# Patient Record
Sex: MALE | Race: WHITE | NOT HISPANIC OR LATINO | Employment: FULL TIME | URBAN - METROPOLITAN AREA
[De-identification: names, ages, dates, MRNs, and addresses within clinical notes are randomized per-mention and may not be internally consistent; named-entity substitution may affect disease eponyms.]

---

## 2023-02-28 LAB
ALANINE AMINOTRANSFERASE (SGPT) (U/L) IN SER/PLAS: 12 U/L (ref 10–52)
ALBUMIN (G/DL) IN SER/PLAS: 4.8 G/DL (ref 3.4–5)
ALKALINE PHOSPHATASE (U/L) IN SER/PLAS: 52 U/L (ref 33–120)
ANION GAP IN SER/PLAS: 11 MMOL/L (ref 10–20)
ASPARTATE AMINOTRANSFERASE (SGOT) (U/L) IN SER/PLAS: 17 U/L (ref 9–39)
BASOPHILS (10*3/UL) IN BLOOD BY AUTOMATED COUNT: 0.03 X10E9/L (ref 0–0.1)
BASOPHILS/100 LEUKOCYTES IN BLOOD BY AUTOMATED COUNT: 0.7 % (ref 0–2)
BILIRUBIN TOTAL (MG/DL) IN SER/PLAS: 0.8 MG/DL (ref 0–1.2)
C REACTIVE PROTEIN (MG/L) IN SER/PLAS BY HIGH SENSIT: 1.1 MG/L
CALCIUM (MG/DL) IN SER/PLAS: 9.8 MG/DL (ref 8.6–10.6)
CARBON DIOXIDE, TOTAL (MMOL/L) IN SER/PLAS: 29 MMOL/L (ref 21–32)
CHLORIDE (MMOL/L) IN SER/PLAS: 103 MMOL/L (ref 98–107)
CHOLESTEROL (MG/DL) IN SER/PLAS: 150 MG/DL (ref 0–199)
CHOLESTEROL IN HDL (MG/DL) IN SER/PLAS: 49.8 MG/DL
CHOLESTEROL/HDL RATIO: 3
COBALAMIN (VITAMIN B12) (PG/ML) IN SER/PLAS: 507 PG/ML (ref 211–911)
CREATININE (MG/DL) IN SER/PLAS: 0.95 MG/DL (ref 0.5–1.3)
EOSINOPHILS (10*3/UL) IN BLOOD BY AUTOMATED COUNT: 0.19 X10E9/L (ref 0–0.7)
EOSINOPHILS/100 LEUKOCYTES IN BLOOD BY AUTOMATED COUNT: 4.4 % (ref 0–6)
ERYTHROCYTE DISTRIBUTION WIDTH (RATIO) BY AUTOMATED COUNT: 13.1 % (ref 11.5–14.5)
ERYTHROCYTE MEAN CORPUSCULAR HEMOGLOBIN CONCENTRATION (G/DL) BY AUTOMATED: 33.2 G/DL (ref 32–36)
ERYTHROCYTE MEAN CORPUSCULAR VOLUME (FL) BY AUTOMATED COUNT: 87 FL (ref 80–100)
ERYTHROCYTES (10*6/UL) IN BLOOD BY AUTOMATED COUNT: 5.47 X10E12/L (ref 4.5–5.9)
GFR MALE: >90 ML/MIN/1.73M2
GLUCOSE (MG/DL) IN SER/PLAS: 78 MG/DL (ref 74–99)
HEMATOCRIT (%) IN BLOOD BY AUTOMATED COUNT: 47.6 % (ref 41–52)
HEMOGLOBIN (G/DL) IN BLOOD: 15.8 G/DL (ref 13.5–17.5)
IMMATURE GRANULOCYTES/100 LEUKOCYTES IN BLOOD BY AUTOMATED COUNT: 0.2 % (ref 0–0.9)
LDL: 84 MG/DL (ref 0–99)
LEUKOCYTES (10*3/UL) IN BLOOD BY AUTOMATED COUNT: 4.3 X10E9/L (ref 4.4–11.3)
LYMPHOCYTES (10*3/UL) IN BLOOD BY AUTOMATED COUNT: 1.6 X10E9/L (ref 1.2–4.8)
LYMPHOCYTES/100 LEUKOCYTES IN BLOOD BY AUTOMATED COUNT: 36.9 % (ref 13–44)
MONOCYTES (10*3/UL) IN BLOOD BY AUTOMATED COUNT: 0.26 X10E9/L (ref 0.1–1)
MONOCYTES/100 LEUKOCYTES IN BLOOD BY AUTOMATED COUNT: 6 % (ref 2–10)
NEUTROPHILS (10*3/UL) IN BLOOD BY AUTOMATED COUNT: 2.25 X10E9/L (ref 1.2–7.7)
NEUTROPHILS/100 LEUKOCYTES IN BLOOD BY AUTOMATED COUNT: 51.8 % (ref 40–80)
NRBC (PER 100 WBCS) BY AUTOMATED COUNT: 0 /100 WBC (ref 0–0)
PLATELETS (10*3/UL) IN BLOOD AUTOMATED COUNT: 185 X10E9/L (ref 150–450)
POTASSIUM (MMOL/L) IN SER/PLAS: 4.1 MMOL/L (ref 3.5–5.3)
PROTEIN TOTAL: 6.8 G/DL (ref 6.4–8.2)
SODIUM (MMOL/L) IN SER/PLAS: 139 MMOL/L (ref 136–145)
THYROTROPIN (MIU/L) IN SER/PLAS BY DETECTION LIMIT <= 0.05 MIU/L: 2.22 MIU/L (ref 0.44–3.98)
TRIGLYCERIDE (MG/DL) IN SER/PLAS: 81 MG/DL (ref 0–149)
UREA NITROGEN (MG/DL) IN SER/PLAS: 18 MG/DL (ref 6–23)
VLDL: 16 MG/DL (ref 0–40)

## 2024-02-06 ENCOUNTER — OFFICE VISIT (OUTPATIENT)
Dept: DERMATOLOGY | Facility: CLINIC | Age: 34
End: 2024-02-06
Payer: COMMERCIAL

## 2024-02-06 DIAGNOSIS — L81.4 LENTIGO: ICD-10-CM

## 2024-02-06 DIAGNOSIS — D22.9 MULTIPLE BENIGN NEVI: Primary | ICD-10-CM

## 2024-02-06 PROCEDURE — 99213 OFFICE O/P EST LOW 20 MIN: CPT | Performed by: STUDENT IN AN ORGANIZED HEALTH CARE EDUCATION/TRAINING PROGRAM

## 2024-02-09 NOTE — PROGRESS NOTES
Subjective     Dario Hernandez is a 33 y.o. male who presents for the following: Skin Check (FBSE).     Review of Systems:  No other skin or systemic complaints other than what is documented elsewhere in the note.    The following portions of the chart were reviewed this encounter and updated as appropriate:          Skin Cancer History  No skin cancer on file.      Specialty Problems    None       Objective   Well appearing patient in no apparent distress; mood and affect are within normal limits.    A full skin examination was performed. All findings within normal limits unless otherwise noted below.    Assessment/Plan   1. Multiple benign nevi    Exam findings: Benign appearing macules and papules  Plan: monitor for any new or changing nevi. Notify me should this occur.  Over the counter use of sun screen product (30+ SPF with mineral sun screen) recommended      2. Lentigo      Well healed scar on left shoulder without recurrence of prior biopsied nevi    KOMAL in 1 year

## 2024-02-17 RX ORDER — FLUTICASONE PROPIONATE 50 MCG
2 SPRAY, SUSPENSION (ML) NASAL DAILY
COMMUNITY
Start: 2017-05-17

## 2024-02-17 RX ORDER — UBIDECARENONE 30 MG
1 CAPSULE ORAL DAILY
COMMUNITY
Start: 2022-02-24

## 2024-02-18 NOTE — PROGRESS NOTES
Subjective   Patient ID: Dario Hernandez is a 33 y.o. male who presents for CPE    HPI   Over the past year, the patient reports that he has only occasionally noted blood on the toilet paper after wiping after defecation.  He reports again that the episodes seem to only occur when passing a hard stool.  He does report occasional pain during defecation.  He reports no other associated symptoms.    Over the past year, he reports that the only time that he will experience itching in the anal region is if there is moisture in the perianal region such as after he exercises.  He reports no associated symptoms.  Over the past year, he reports a decrease in the frequency and intensity of the episodes.    No other new complaints.    Again, the patient is not using Flonase very often, he is not using a fiber supplement, and he is only infrequently applying 2.5% hydrocortisone cream to the perianal region  Review of Systems  All systems have been reviewed and are normal except as previously noted  Objective   There were no vitals taken for this visit.    Physical Exam  Head - palpation revealed no tenderness over the maxillary or frontal sinuses.  Eyes - extraocular movements intact; pupils equal and reactive to light; fundi revealed good retinal color no hemorrhages or exudates.  Ears - palpation of pinnas and traguses revealed no tenderness. External auditory canals not erythematous or swollen. Right TM clear; left TM not visualized secondary to impacted cerumen  Nose - turbinates not erythematous or swollen; no nasal septal deviation noted.  Mouth - posterior pharynx is not erythematous or swollen. Tonsillar pillars appeared normal no exudates.  Neck - no lymphadenopathy. Thyroid gland not enlarged. No bruits.  Lungs - clear to auscultation bilaterally.  Cardiac -bradycardic, rhythm regular, no murmurs, no JVD.  Abdomen - soft nondistended normal active bowel sounds. Palpation revealed no tenderness or  masses.  Genitourinary:  Penis - no erythema, lesions, or discharge.  Scrotum - no erythema, lesions, or swelling, palpation revealed no tenderness or increase in warmth.   Pulses - 2+ bilaterally in the upper extremities; 0 bilaterally in the lower extremities  Extremities - no peripheral edema.  Skin - multiple nevi noted diffusely over the back and both arms.. There is a erythematous plaque 6 cm in diameter noted surrounding the anus . No necrotic tissue noted. No drainage noted. No surrounding erythema noted. Palpation revealed no tenderness or increase in warmth  Severe xerosis noted on both hands.    Neurologic  Mental status-alert and oriented x3   Cranial nerves-2 through 12 grossly intact, no visual field abnormalities  Motor-no pronator drift noted, strength-5/5 in all muscle groups tested, , no tremor noted.  No bradykinesia noted.  No rigidity noted.  Negative pull test  Sensory-Light touch sensation fully intact  Pinprick sensation fully intact  Vibratory sensation fully intact  Cerebellar-no truncal ataxia, good coordination finger-nose testing,, good coordination heel-to-shin testing, normal rapid alternating movements  Romberg negative, no abnormality in tandem gait  Reflexes-2+/4 bilaterally  Assessment/Plan        Assessment  Elevated diastolic blood pressure-may be secondary to whitecoat hypertension, essential hypertension-less likely  Chronic occasional nasal congestion, chronic frequent blowing of the nose, chronic occasional sensation of mucus being in the back of the throat-may be secondary to nonallergic rhinitis  COVID-19 December 2020  Globus pharyngeus.  Chronic occasional episodes of hematochezia-likely secondary to hemorrhoidal bleeding and/or perianal dermatitis  Chronic frequent episodes of itching in the anal region-probably secondary to pruritus ani probably secondary to perianal dermatitis  Pruritus ani-6/1/2021-probably secondary to perianal dermatitis  Vitamin D  deficiency  Multiple nevi  Plan  Obtain urinalysis, CBC differential, CMP, fasting lipid profile, TSH, vitamin D level, vitamin B-12 level, cardiac CRP today.  I have recommended that the patient check his blood pressure on a home blood pressure monitor once or twice per week for the next month and to call me or send these measurements to me electronically.  I again emphasized the importance of the patient using Flonase spray 1 spray to each nostril twice daily as needed, Benefiber 5 mL p.o. daily as needed and applying 2.5% hydrocortisone cream to the perianal region twice daily as needed  The patient will return for a complete physical examination in 1 year

## 2024-02-20 ENCOUNTER — LAB (OUTPATIENT)
Dept: LAB | Facility: LAB | Age: 34
End: 2024-02-20
Payer: COMMERCIAL

## 2024-02-20 ENCOUNTER — OFFICE VISIT (OUTPATIENT)
Dept: PRIMARY CARE | Facility: CLINIC | Age: 34
End: 2024-02-20
Payer: COMMERCIAL

## 2024-02-20 VITALS
DIASTOLIC BLOOD PRESSURE: 94 MMHG | BODY MASS INDEX: 23.87 KG/M2 | HEART RATE: 50 BPM | WEIGHT: 152.4 LBS | SYSTOLIC BLOOD PRESSURE: 116 MMHG

## 2024-02-20 DIAGNOSIS — Z00.00 ROUTINE GENERAL MEDICAL EXAMINATION AT A HEALTH CARE FACILITY: ICD-10-CM

## 2024-02-20 DIAGNOSIS — Z00.00 ROUTINE GENERAL MEDICAL EXAMINATION AT A HEALTH CARE FACILITY: Primary | ICD-10-CM

## 2024-02-20 DIAGNOSIS — R03.0 ELEVATED BLOOD PRESSURE READING IN OFFICE WITHOUT DIAGNOSIS OF HYPERTENSION: ICD-10-CM

## 2024-02-20 DIAGNOSIS — R53.82 CHRONIC FATIGUE: ICD-10-CM

## 2024-02-20 PROBLEM — L29.0 PRURITUS ANI: Status: ACTIVE | Noted: 2024-02-20

## 2024-02-20 PROBLEM — D22.9 MULTIPLE NEVI: Status: ACTIVE | Noted: 2024-02-20

## 2024-02-20 PROBLEM — R09.A2 GLOBUS PHARYNGEUS: Status: ACTIVE | Noted: 2024-02-20

## 2024-02-20 LAB
ALBUMIN SERPL BCP-MCNC: 4.9 G/DL (ref 3.4–5)
ALP SERPL-CCNC: 67 U/L (ref 33–120)
ALT SERPL W P-5'-P-CCNC: 13 U/L (ref 10–52)
ANION GAP SERPL CALC-SCNC: 16 MMOL/L (ref 10–20)
AST SERPL W P-5'-P-CCNC: 16 U/L (ref 9–39)
BASOPHILS # BLD AUTO: 0.02 X10*3/UL (ref 0–0.1)
BASOPHILS NFR BLD AUTO: 0.4 %
BILIRUB SERPL-MCNC: 1 MG/DL (ref 0–1.2)
BUN SERPL-MCNC: 21 MG/DL (ref 6–23)
CALCIUM SERPL-MCNC: 10.3 MG/DL (ref 8.6–10.6)
CHLORIDE SERPL-SCNC: 105 MMOL/L (ref 98–107)
CHOLEST SERPL-MCNC: 160 MG/DL (ref 0–199)
CHOLESTEROL/HDL RATIO: 3.2
CO2 SERPL-SCNC: 25 MMOL/L (ref 21–32)
CREAT SERPL-MCNC: 0.95 MG/DL (ref 0.5–1.3)
CRP SERPL HS-MCNC: 0.8 MG/L
EGFRCR SERPLBLD CKD-EPI 2021: >90 ML/MIN/1.73M*2
EOSINOPHIL # BLD AUTO: 0.06 X10*3/UL (ref 0–0.7)
EOSINOPHIL NFR BLD AUTO: 1.2 %
ERYTHROCYTE [DISTWIDTH] IN BLOOD BY AUTOMATED COUNT: 13 % (ref 11.5–14.5)
GLUCOSE SERPL-MCNC: 86 MG/DL (ref 74–99)
HCT VFR BLD AUTO: 47.4 % (ref 41–52)
HDLC SERPL-MCNC: 50.2 MG/DL
HGB BLD-MCNC: 16.3 G/DL (ref 13.5–17.5)
IMM GRANULOCYTES # BLD AUTO: 0.01 X10*3/UL (ref 0–0.7)
IMM GRANULOCYTES NFR BLD AUTO: 0.2 % (ref 0–0.9)
LDLC SERPL CALC-MCNC: 91 MG/DL
LYMPHOCYTES # BLD AUTO: 1.8 X10*3/UL (ref 1.2–4.8)
LYMPHOCYTES NFR BLD AUTO: 35 %
MCH RBC QN AUTO: 29.9 PG (ref 26–34)
MCHC RBC AUTO-ENTMCNC: 34.4 G/DL (ref 32–36)
MCV RBC AUTO: 87 FL (ref 80–100)
MONOCYTES # BLD AUTO: 0.32 X10*3/UL (ref 0.1–1)
MONOCYTES NFR BLD AUTO: 6.2 %
NEUTROPHILS # BLD AUTO: 2.93 X10*3/UL (ref 1.2–7.7)
NEUTROPHILS NFR BLD AUTO: 57 %
NON HDL CHOLESTEROL: 110 MG/DL (ref 0–149)
NRBC BLD-RTO: 0 /100 WBCS (ref 0–0)
PLATELET # BLD AUTO: 236 X10*3/UL (ref 150–450)
POC APPEARANCE, URINE: CLEAR
POC BILIRUBIN, URINE: NEGATIVE
POC BLOOD, URINE: NEGATIVE
POC COLOR, URINE: YELLOW
POC GLUCOSE, URINE: NEGATIVE MG/DL
POC KETONES, URINE: NEGATIVE MG/DL
POC LEUKOCYTES, URINE: NEGATIVE
POC NITRITE,URINE: NEGATIVE
POC PH, URINE: 8.5 PH
POC PROTEIN, URINE: NEGATIVE MG/DL
POC SPECIFIC GRAVITY, URINE: 1.02
POC UROBILINOGEN, URINE: 1 EU/DL
POTASSIUM SERPL-SCNC: 4.2 MMOL/L (ref 3.5–5.3)
PROT SERPL-MCNC: 7.4 G/DL (ref 6.4–8.2)
RBC # BLD AUTO: 5.45 X10*6/UL (ref 4.5–5.9)
SODIUM SERPL-SCNC: 142 MMOL/L (ref 136–145)
TRIGL SERPL-MCNC: 93 MG/DL (ref 0–149)
TSH SERPL-ACNC: 2.09 MIU/L (ref 0.44–3.98)
VIT B12 SERPL-MCNC: 527 PG/ML (ref 211–911)
VLDL: 19 MG/DL (ref 0–40)
WBC # BLD AUTO: 5.1 X10*3/UL (ref 4.4–11.3)

## 2024-02-20 PROCEDURE — 80061 LIPID PANEL: CPT

## 2024-02-20 PROCEDURE — 80053 COMPREHEN METABOLIC PANEL: CPT

## 2024-02-20 PROCEDURE — 99212 OFFICE O/P EST SF 10 MIN: CPT | Performed by: INTERNAL MEDICINE

## 2024-02-20 PROCEDURE — 36415 COLL VENOUS BLD VENIPUNCTURE: CPT

## 2024-02-20 PROCEDURE — 99395 PREV VISIT EST AGE 18-39: CPT | Performed by: INTERNAL MEDICINE

## 2024-02-20 PROCEDURE — 84443 ASSAY THYROID STIM HORMONE: CPT

## 2024-02-20 PROCEDURE — 82607 VITAMIN B-12: CPT

## 2024-02-20 PROCEDURE — 86141 C-REACTIVE PROTEIN HS: CPT

## 2024-02-20 PROCEDURE — 93000 ELECTROCARDIOGRAM COMPLETE: CPT | Performed by: INTERNAL MEDICINE

## 2024-02-20 PROCEDURE — 81002 URINALYSIS NONAUTO W/O SCOPE: CPT | Performed by: INTERNAL MEDICINE

## 2024-02-20 PROCEDURE — 85025 COMPLETE CBC W/AUTO DIFF WBC: CPT

## 2025-02-10 ENCOUNTER — APPOINTMENT (OUTPATIENT)
Dept: DERMATOLOGY | Facility: CLINIC | Age: 35
End: 2025-02-10
Payer: COMMERCIAL

## 2025-02-10 DIAGNOSIS — L81.4 LENTIGO: ICD-10-CM

## 2025-02-10 DIAGNOSIS — L40.9 PSORIASIS: ICD-10-CM

## 2025-02-10 DIAGNOSIS — D22.9 MULTIPLE BENIGN NEVI: Primary | ICD-10-CM

## 2025-02-10 DIAGNOSIS — L90.5 SCAR CONDITIONS AND FIBROSIS OF SKIN: ICD-10-CM

## 2025-02-10 PROCEDURE — 99214 OFFICE O/P EST MOD 30 MIN: CPT | Performed by: STUDENT IN AN ORGANIZED HEALTH CARE EDUCATION/TRAINING PROGRAM

## 2025-02-10 RX ORDER — CLOBETASOL PROPIONATE 0.5 MG/ML
SOLUTION TOPICAL DAILY
Qty: 50 ML | Refills: 2 | Status: SHIPPED | OUTPATIENT
Start: 2025-02-10 | End: 2025-03-12

## 2025-02-10 ASSESSMENT — DERMATOLOGY QUALITY OF LIFE (QOL) ASSESSMENT
DATE THE QUALITY-OF-LIFE ASSESSMENT WAS COMPLETED: 67246
RATE HOW EMOTIONALLY BOTHERED YOU ARE BY YOUR SKIN PROBLEM (FOR EXAMPLE, WORRY, EMBARRASSMENT, FRUSTRATION): 0 - NEVER BOTHERED
RATE HOW BOTHERED YOU ARE BY SYMPTOMS OF YOUR SKIN PROBLEM (EG, ITCHING, STINGING BURNING, HURTING OR SKIN IRRITATION): 0 - NEVER BOTHERED
RATE HOW BOTHERED YOU ARE BY EFFECTS OF YOUR SKIN PROBLEMS ON YOUR ACTIVITIES (EG, GOING OUT, ACCOMPLISHING WHAT YOU WANT, WORK ACTIVITIES OR YOUR RELATIONSHIPS WITH OTHERS): 0 - NEVER BOTHERED
ARE THERE EXCLUSIONS OR EXCEPTIONS FOR THE QUALITY OF LIFE ASSESSMENT: NO

## 2025-02-10 ASSESSMENT — DERMATOLOGY PATIENT ASSESSMENT: DO YOU HAVE ANY NEW OR CHANGING LESIONS: NO

## 2025-02-10 ASSESSMENT — ITCH NUMERIC RATING SCALE: HOW SEVERE IS YOUR ITCHING?: 0

## 2025-02-10 ASSESSMENT — PATIENT GLOBAL ASSESSMENT (PGA): PATIENT GLOBAL ASSESSMENT: PATIENT GLOBAL ASSESSMENT:  1 - CLEAR

## 2025-02-10 NOTE — PROGRESS NOTES
Subjective     Dario Hernandez is a 34 y.o. male who presents for the following: Skin Check.     Review of Systems:  No other skin or systemic complaints other than what is documented elsewhere in the note.    The following portions of the chart were reviewed this encounter and updated as appropriate:         Skin Cancer History  No skin cancer on file.      Specialty Problems          Dermatology Problems    Multiple nevi        Objective   Well appearing patient in no apparent distress; mood and affect are within normal limits.    A full examination was performed including scalp, head, eyes, ears, nose, lips, neck, chest, axillae, abdomen, back, buttocks, bilateral upper extremities, bilateral lower extremities, hands, feet, fingers, toes, fingernails, and toenails. All findings within normal limits unless otherwise noted below.    Assessment/Plan   1. Multiple benign nevi    Exam findings: Benign appearing macules and papules  Plan: monitor for any new or changing nevi. Notify me should this occur.  Over the counter use of sun screen product (30+ SPF with mineral sun screen) recommended      2. Psoriasis  Poorly controlled  Chronic    Left Ear, Right Ear, Scalp  Well-demarcated erythematous papules and plaques with overlying scale.    -Discussed the nature of the condition.    Recommend:    -Start Clobetasol 0.05% solution daily.  -Follow up in 3 months.     Related Medications  clobetasol (Temovate) 0.05 % external solution  Apply topically once daily.    3. Lentigo  Tan macules    -Benign appearing on exam  -Reassurance, recommend observation    4. Scar conditions and fibrosis of skin  Left Chest  Well healed scar on left chest without recurrence of prior biopsied nevi         Follow up in 3 months for psoriasis.  Follow up in 1 year for TBSE.     I was present during all key portions of visit including history, exam, discussion/plan and/or procedures and directly supervised our resident during all portions of  the visit, follow up care, medications and more    Gavino Teague MD

## 2025-02-22 NOTE — PROGRESS NOTES
Subjective   Patient ID: Dario Hernandez is a 34 y.o. male who presents for CPE    HPI   Since the patient began application of clobetasol solution to his scalp, he has noted less scaling and a decrease in the frequency of itching in the plaque located in the occipital region.  He reports no other new complaints.  Review of Systems  All systems have been reviewed and are normal except as previously noted  Objective   There were no vitals taken for this visit.    Physical Exam  Head - palpation revealed no tenderness over the maxillary or frontal sinuses.  Eyes - extraocular movements intact; pupils equal and reactive to light; fundi revealed good retinal color no hemorrhages or exudates.  Ears - palpation of pinnas and traguses revealed no tenderness. External auditory canals not erythematous or swollen. Right TM clear; left TM not visualized secondary to impacted cerumen  Nose - turbinates not erythematous or swollen; no nasal septal deviation noted.  Mouth - posterior pharynx is not erythematous or swollen. Tonsillar pillars appeared normal no exudates.  Neck - no lymphadenopathy. Thyroid gland not enlarged. No bruits.  Lungs - clear to auscultation bilaterally.  Cardiac -rate normal,, rhythm regular, no murmurs, no JVD.  Abdomen - soft nondistended normal active bowel sounds. Palpation revealed no tenderness or masses.  Genitourinary:  Penis - no erythema, lesions, or discharge.  Scrotum - no erythema, lesions, or swelling, palpation revealed no tenderness or increase in warmth.   Pulses - 2+ bilaterally in the upper extremities; 2+ bilaterally in the lower extremities except dorsalis pedis pulses-0 bilaterally  Extremities - no peripheral edema.  Skin - multiple nevi noted diffusely over the back and both arms...  There is an 8 x 8 cm erythematous plaque located in the occipital region.  No necrotic tissue noted.  No drainage noted.  No surrounding erythema noted.  Palpation revealed no tenderness or increase in  warmth there is a erythematous plaque 6 cm in diameter noted surrounding the anus . No necrotic tissue noted. No drainage noted. No surrounding erythema noted. Palpation revealed no tenderness or increase in warmth  Severe xerosis noted on both hands.     Neurologic  Mental status-alert and oriented x3   Cranial nerves-2 through 12 grossly intact, no visual field abnormalities  Motor-no pronator drift noted, strength-5/5 in all muscle groups tested, , no tremor noted.  No bradykinesia noted.  No rigidity noted.  Negative pull test  Sensory-Light touch sensation fully intact  Pinprick sensation fully intact  Vibratory sensation fully intact  Cerebellar-no truncal ataxia, good coordination finger-nose testing,, good coordination heel-to-shin testing, normal rapid alternating movements  Romberg negative, no abnormality in tandem gait  Reflexes-2+/4 bilaterally  Assessment/Plan   Problem List Items Addressed This Visit             ICD-10-CM    Globus pharyngeus R09.A2    Relevant Orders    CBC and Auto Differential    Comprehensive Metabolic Panel    C-Reactive Protein, High Sensitivity    Lipid Panel    Thyroid Stimulating Hormone    Vitamin B12    Vitamin D 25-Hydroxy,Total (for eval of Vitamin D levels)    Multiple nevi D22.9    Relevant Orders    CBC and Auto Differential    Comprehensive Metabolic Panel    C-Reactive Protein, High Sensitivity    Lipid Panel    Thyroid Stimulating Hormone    Vitamin B12    Vitamin D 25-Hydroxy,Total (for eval of Vitamin D levels)     Other Visit Diagnoses         Codes    Routine general medical examination at a health care facility    -  Primary Z00.00    Relevant Orders    CBC and Auto Differential    Comprehensive Metabolic Panel    C-Reactive Protein, High Sensitivity    Lipid Panel    Thyroid Stimulating Hormone    Vitamin B12    Vitamin D 25-Hydroxy,Total (for eval of Vitamin D levels)    Elevated blood pressure reading in office without diagnosis of hypertension     R03.0     Relevant Orders    CBC and Auto Differential    Comprehensive Metabolic Panel    C-Reactive Protein, High Sensitivity    Lipid Panel    Thyroid Stimulating Hormone    Vitamin B12    Vitamin D 25-Hydroxy,Total (for eval of Vitamin D levels)              Assessment    Chronic occasional nasal congestion, chronic frequent blowing of the nose, chronic occasional sensation of mucus being in the back of the throat-may be secondary to nonallergic rhinitis  COVID-19 December 2020  Globus pharyngeus.  Chronic occasional episodes of hematochezia-likely secondary to hemorrhoidal bleeding and/or perianal dermatitis  Chronic infrequent episodes of itching in the anal region-probably secondary to pruritus ani probably secondary to perianal dermatitis  Pruritus ani-6/1/2021-probably secondary to perianal dermatitis  Vitamin D deficiency  Psoriasis  Multiple nevi  Plan  Obtain urinalysis, CBC differential, CMP, fasting lipid profile, TSH, vitamin D level, vitamin B-12 level, cardiac CRP today  I again told the patient that he could use Flonase spray 1 spray to each nostril twice daily as needed for the aforementioned nasal symptoms Benefiber 5 mL p.o. daily as needed I also recommended that he apply 2.5% hydrocortisone cream to the perianal region twice daily as needed.  He will continue application of clobetasol as directed by his dermatologist  He will return for a complete physical examination in 1 year

## 2025-02-24 ENCOUNTER — APPOINTMENT (OUTPATIENT)
Dept: PRIMARY CARE | Facility: CLINIC | Age: 35
End: 2025-02-24
Payer: COMMERCIAL

## 2025-02-24 VITALS
TEMPERATURE: 98.1 F | BODY MASS INDEX: 20.29 KG/M2 | SYSTOLIC BLOOD PRESSURE: 94 MMHG | HEIGHT: 67 IN | WEIGHT: 129.3 LBS | DIASTOLIC BLOOD PRESSURE: 68 MMHG | HEART RATE: 64 BPM

## 2025-02-24 DIAGNOSIS — R03.0 ELEVATED BLOOD PRESSURE READING IN OFFICE WITHOUT DIAGNOSIS OF HYPERTENSION: ICD-10-CM

## 2025-02-24 DIAGNOSIS — D22.9 MULTIPLE NEVI: ICD-10-CM

## 2025-02-24 DIAGNOSIS — Z00.00 ROUTINE GENERAL MEDICAL EXAMINATION AT A HEALTH CARE FACILITY: Primary | ICD-10-CM

## 2025-02-24 DIAGNOSIS — R09.A2 GLOBUS PHARYNGEUS: ICD-10-CM

## 2025-02-24 LAB
POC APPEARANCE, URINE: CLEAR
POC BILIRUBIN, URINE: ABNORMAL
POC BLOOD, URINE: NEGATIVE
POC COLOR, URINE: YELLOW
POC GLUCOSE, URINE: NEGATIVE MG/DL
POC KETONES, URINE: ABNORMAL MG/DL
POC LEUKOCYTES, URINE: NEGATIVE
POC NITRITE,URINE: NEGATIVE
POC PH, URINE: 5.5 PH
POC PROTEIN, URINE: ABNORMAL MG/DL
POC SPECIFIC GRAVITY, URINE: >=1.03
POC UROBILINOGEN, URINE: 0.2 EU/DL

## 2025-02-24 PROCEDURE — 1036F TOBACCO NON-USER: CPT | Performed by: INTERNAL MEDICINE

## 2025-02-24 PROCEDURE — 99395 PREV VISIT EST AGE 18-39: CPT | Performed by: INTERNAL MEDICINE

## 2025-02-24 PROCEDURE — 3008F BODY MASS INDEX DOCD: CPT | Performed by: INTERNAL MEDICINE

## 2025-02-24 PROCEDURE — 81002 URINALYSIS NONAUTO W/O SCOPE: CPT | Performed by: INTERNAL MEDICINE

## 2025-02-24 RX ORDER — CHOLECALCIFEROL (VITAMIN D3) 50 MCG
50 TABLET ORAL
COMMUNITY

## 2025-02-25 LAB
25(OH)D3+25(OH)D2 SERPL-MCNC: 45 NG/ML (ref 30–100)
ALBUMIN SERPL-MCNC: 5.2 G/DL (ref 3.6–5.1)
ALP SERPL-CCNC: 49 U/L (ref 36–130)
ALT SERPL-CCNC: 13 U/L (ref 9–46)
ANION GAP SERPL CALCULATED.4IONS-SCNC: 10 MMOL/L (CALC) (ref 7–17)
AST SERPL-CCNC: 22 U/L (ref 10–40)
BASOPHILS # BLD AUTO: 12 CELLS/UL (ref 0–200)
BASOPHILS NFR BLD AUTO: 0.3 %
BILIRUB SERPL-MCNC: 0.9 MG/DL (ref 0.2–1.2)
BUN SERPL-MCNC: 20 MG/DL (ref 7–25)
CALCIUM SERPL-MCNC: 9.8 MG/DL (ref 8.6–10.3)
CHLORIDE SERPL-SCNC: 106 MMOL/L (ref 98–110)
CHOLEST SERPL-MCNC: 171 MG/DL
CHOLEST/HDLC SERPL: 2.7 (CALC)
CO2 SERPL-SCNC: 25 MMOL/L (ref 20–32)
CREAT SERPL-MCNC: 1.05 MG/DL (ref 0.6–1.26)
CREAT UR-MCNC: 509 MG/DL (ref 20–320)
CRP SERPL HS-MCNC: 0.7 MG/L
EGFRCR SERPLBLD CKD-EPI 2021: 97 ML/MIN/1.73M2
EOSINOPHIL # BLD AUTO: 80 CELLS/UL (ref 15–500)
EOSINOPHIL NFR BLD AUTO: 2 %
ERYTHROCYTE [DISTWIDTH] IN BLOOD BY AUTOMATED COUNT: 13 % (ref 11–15)
GLUCOSE SERPL-MCNC: 82 MG/DL (ref 65–99)
HCT VFR BLD AUTO: 49.6 % (ref 38.5–50)
HDLC SERPL-MCNC: 64 MG/DL
HGB BLD-MCNC: 16.8 G/DL (ref 13.2–17.1)
LDLC SERPL CALC-MCNC: 93 MG/DL (CALC)
LYMPHOCYTES # BLD AUTO: 1664 CELLS/UL (ref 850–3900)
LYMPHOCYTES NFR BLD AUTO: 41.6 %
MCH RBC QN AUTO: 30 PG (ref 27–33)
MCHC RBC AUTO-ENTMCNC: 33.9 G/DL (ref 32–36)
MCV RBC AUTO: 88.6 FL (ref 80–100)
MONOCYTES # BLD AUTO: 260 CELLS/UL (ref 200–950)
MONOCYTES NFR BLD AUTO: 6.5 %
NEUTROPHILS # BLD AUTO: 1984 CELLS/UL (ref 1500–7800)
NEUTROPHILS NFR BLD AUTO: 49.6 %
NONHDLC SERPL-MCNC: 107 MG/DL (CALC)
PLATELET # BLD AUTO: 191 THOUSAND/UL (ref 140–400)
PMV BLD REES-ECKER: 11.8 FL (ref 7.5–12.5)
POTASSIUM SERPL-SCNC: 4.5 MMOL/L (ref 3.5–5.3)
PROT SERPL-MCNC: 7.3 G/DL (ref 6.1–8.1)
PROT UR-MCNC: 26 MG/DL (ref 5–25)
PROT/CREAT UR: 0.05 MG/MG CREAT (ref 0.03–0.15)
PROT/CREAT UR: 51 MG/G CREAT (ref 25–148)
RBC # BLD AUTO: 5.6 MILLION/UL (ref 4.2–5.8)
SODIUM SERPL-SCNC: 141 MMOL/L (ref 135–146)
TRIGL SERPL-MCNC: 47 MG/DL
TSH SERPL-ACNC: 1.38 MIU/L (ref 0.4–4.5)
VIT B12 SERPL-MCNC: 523 PG/ML (ref 200–1100)
WBC # BLD AUTO: 4 THOUSAND/UL (ref 3.8–10.8)

## 2025-05-16 ENCOUNTER — APPOINTMENT (OUTPATIENT)
Dept: DERMATOLOGY | Facility: CLINIC | Age: 35
End: 2025-05-16
Payer: COMMERCIAL

## 2025-05-16 DIAGNOSIS — L65.9 HAIR THINNING: ICD-10-CM

## 2025-05-16 DIAGNOSIS — L40.9 PSORIASIS: Primary | ICD-10-CM

## 2025-05-16 PROCEDURE — 99214 OFFICE O/P EST MOD 30 MIN: CPT | Performed by: STUDENT IN AN ORGANIZED HEALTH CARE EDUCATION/TRAINING PROGRAM

## 2025-05-16 RX ORDER — MINOXIDIL 2.5 MG/1
TABLET ORAL
Qty: 30 TABLET | Refills: 5 | Status: SHIPPED | OUTPATIENT
Start: 2025-05-16

## 2025-05-16 ASSESSMENT — PATIENT GLOBAL ASSESSMENT (PGA): PATIENT GLOBAL ASSESSMENT: PATIENT GLOBAL ASSESSMENT:  1 - CLEAR

## 2025-05-16 ASSESSMENT — DERMATOLOGY PATIENT ASSESSMENT: DO YOU HAVE ANY NEW OR CHANGING LESIONS: NO

## 2025-05-16 ASSESSMENT — DERMATOLOGY QUALITY OF LIFE (QOL) ASSESSMENT
RATE HOW EMOTIONALLY BOTHERED YOU ARE BY YOUR SKIN PROBLEM (FOR EXAMPLE, WORRY, EMBARRASSMENT, FRUSTRATION): 0 - NEVER BOTHERED
WHAT SINGLE SKIN CONDITION LISTED BELOW IS THE PATIENT ANSWERING THE QUALITY-OF-LIFE ASSESSMENT QUESTIONS ABOUT: PSORIASIS
DATE THE QUALITY-OF-LIFE ASSESSMENT WAS COMPLETED: 67341
RATE HOW BOTHERED YOU ARE BY EFFECTS OF YOUR SKIN PROBLEMS ON YOUR ACTIVITIES (EG, GOING OUT, ACCOMPLISHING WHAT YOU WANT, WORK ACTIVITIES OR YOUR RELATIONSHIPS WITH OTHERS): 0 - NEVER BOTHERED
ARE THERE EXCLUSIONS OR EXCEPTIONS FOR THE QUALITY OF LIFE ASSESSMENT: NO
RATE HOW BOTHERED YOU ARE BY SYMPTOMS OF YOUR SKIN PROBLEM (EG, ITCHING, STINGING BURNING, HURTING OR SKIN IRRITATION): 0 - NEVER BOTHERED

## 2025-05-16 ASSESSMENT — ITCH NUMERIC RATING SCALE: HOW SEVERE IS YOUR ITCHING?: 0

## 2025-05-16 NOTE — Clinical Note
- reviewed commonly used medications for hair thinning and side fx including minoxidil, finasteride, and dutasteride  - reviewed that finasteride/dutasteride can cause some sexual side fx  - discussed that ongoing treatment will be required to regrow and/or maintain hair given this is a chronic condition  - discussed PO minoxidil and potential for hypotension, need to stay hydrated, risks include very rare but potential cardiac complications including pericarditis, CHF, and other adverse effects including hypertrichosis, palpitations, hypotension, edema, headache

## 2025-05-16 NOTE — PROGRESS NOTES
Subjective     Dario Hernandez is a 34 y.o. male who presents for the following: Psoriasis (No active plaques - Tx: Clobetasol 0.05% solution ).     3mo pso followup. Had behind his ears. Started clobetasol solution 1-2x/day and since doing this his pruritus is resolved and ears are clear today.    Intake Questions  Do you have any new or changing Lesions?: No    Review of Systems:  No other skin or systemic complaints other than what is documented elsewhere in the note.    The following portions of the chart were reviewed this encounter and updated as appropriate:          Skin Cancer History  Biopsy Log Book  No skin cancers from Specimen Tracking.    Additional History      Specialty Problems          Dermatology Problems    Multiple nevi        Objective   Well appearing patient in no apparent distress; mood and affect are within normal limits.    A focused skin examination was performed. All findings within normal limits unless otherwise noted below.    Assessment/Plan   Skin Exam  1. PSORIASIS  Left Ear, Right Ear, Scalp  Clear today  Plaque psoriasis - clear today!  - continue clobetasol 0.05% sln PRN; no need for use on clear skin   - counseled on nature of psoriasis, that even with only skin symptoms it is a systemic autoimmune ailment and that the inflammation can sometimes affect other parts of the body; reviewed that although it is not curable it is a manageable chronic condition that in his case will likely relapse and remit with treatment as needed  - counseled on connection to metabolic syndrome including higher risk of CV disease; pt is of normal BMI but encouraged him to maintain regularity with PCP to ensure lipids, BP, glucose remain wnl  - pt denies pso anywhere else, no joint sx at this time  2. HAIR THINNING  Scalp  Generalized mild hair thinning  - reviewed commonly used medications for hair thinning and side fx including minoxidil, finasteride, and dutasteride  - reviewed that  finasteride/dutasteride can cause some sexual side fx  - discussed that ongoing treatment will be required to regrow and/or maintain hair given this is a chronic condition  - discussed PO minoxidil and potential for hypotension, need to stay hydrated, risks include very rare but potential cardiac complications including pericarditis, CHF, and other adverse effects including hypertrichosis, palpitations, hypotension, edema, headache  minoxidil (Loniten) 2.5 mg tablet - Scalp  Take one tablet by mouth once daily    F/u for pso at his next FBSE in Feb as already scheduled  Pt seen by Dr. Liyah Aquino, PGY-2    I was present during all key portions of visit including history, exam, discussion/plan and/or procedures and directly supervised our resident during all portions of the visit, follow up care, medications and more    Gavino Teague MD

## 2025-05-16 NOTE — Clinical Note
Plaque psoriasis - clear today!  - continue clobetasol 0.05% sln PRN; no need for use on clear skin   - counseled on nature of psoriasis, that even with only skin symptoms it is a systemic autoimmune ailment and that the inflammation can sometimes affect other parts of the body; reviewed that although it is not curable it is a manageable chronic condition that in his case will likely relapse and remit with treatment as needed  - counseled on connection to metabolic syndrome including higher risk of CV disease; pt is of normal BMI but encouraged him to maintain regularity with PCP to ensure lipids, BP, glucose remain wnl  - pt denies pso anywhere else, no joint sx at this time

## 2025-05-16 NOTE — PROGRESS NOTES
Subjective     Dario Hernandez is a 34 y.o. male who presents for the following: Psoriasis (No active plaques - Tx: Clobetasol 0.05% solution ).     3mo pso followup. Had behind his ears. Started clobetasol solution 1-2x/day and since doing this his pruritus is resolved and ears are clear today.    Intake Questions  Do you have any new or changing Lesions?: No    Review of Systems:  No other skin or systemic complaints other than what is documented elsewhere in the note.    The following portions of the chart were reviewed this encounter and updated as appropriate:          Skin Cancer History  Biopsy Log Book  No skin cancers from Specimen Tracking.    Additional History      Specialty Problems          Dermatology Problems    Multiple nevi        Objective   Well appearing patient in no apparent distress; mood and affect are within normal limits.    A focused skin examination was performed. All findings within normal limits unless otherwise noted below.    Assessment/Plan   Skin Exam  1. PSORIASIS  Left Ear, Right Ear, Scalp  Clear today  Plaque psoriasis - clear today!  - continue clobetasol 0.05% sln PRN; no need for use on clear skin   - counseled on nature of psoriasis, that even with only skin symptoms it is a systemic autoimmune ailment and that the inflammation can sometimes affect other parts of the body; reviewed that although it is not curable it is a manageable chronic condition that in his case will likely relapse and remit with treatment as needed  - counseled on connection to metabolic syndrome including higher risk of CV disease; pt is of normal BMI but encouraged him to maintain regularity with PCP to ensure lipids, BP, glucose remain wnl  - pt denies pso anywhere else, no joint sx at this time

## 2026-02-10 ENCOUNTER — APPOINTMENT (OUTPATIENT)
Dept: DERMATOLOGY | Facility: CLINIC | Age: 36
End: 2026-02-10
Payer: COMMERCIAL